# Patient Record
(demographics unavailable — no encounter records)

---

## 2025-07-08 NOTE — DISCUSSION/SUMMARY
[Medication Risks Reviewed] : Medication risks reviewed [PRN] : PRN [de-identified] : 45 minutes was spent reviewing the x-rays as well as discussing with the patient their clinical presentation, diagnosis and providing education.  Conservative treatment was discussed with the patient at length. Anticipatory guidance regarding disease process lumbar spondylosis, avoidance of acute exacerbation this was discussed at length and all patients commenting concerns were answered to the patient's satisfaction. Physical therapy for decrease pain and increase function was ordered. Patient was given home exercises as approved by [employer] and works well held directed toward this particular process. Intermittent use of acetaminophen 500 mg 2 tablets t.i.d. p.r.n. mild to moderate pain, ibuprofen 600 mg t.i.d. p.r.n. severe pain with food or milk if there is no medical contraindication. Home exercise including stretching on a daily basis for 20-30 minutes was recommended. Heat, ice, topical were discussed as needed. The patient will followup in 6-8 weeks at which point in time if symptoms continue we will order MRI studies to guide treatment plan including possible injection therapy with pain management versus surgical option.

## 2025-07-08 NOTE — HISTORY OF PRESENT ILLNESS
[de-identified] : 71-year-old male is here with a complaint of low back pain.  Pain has been intermittent x 13 years, rates 3/10 intermittently.  Described as muscle ache.  Pain began spontaneously.  He leads a very active lifestyle and does daily activities including going to the gym, biking.  He has no decrease in sensation or strength of the legs.  No change in bowel or bladder.  He has not had any physical therapy chiropractic or pain management injections for this issue.  He had an acute flareup of low back pain approximately 3 weeks ago and went to the emergency department and was referred here for follow-up care.  Past medical surgical social family allergy history was reviewed.

## 2025-07-08 NOTE — PHYSICAL EXAM
[de-identified] : CONSTITUTIONAL: The patient is a very pleasant individual who is well-nourished and who appears stated age. PSYCHIATRIC: The patient is alert and oriented X 3 and in no apparent distress, and participates with orthopedic evaluation well. HEAD: Atraumatic and is nonsyndromic in appearance. EENT: No visible thyromegaly, EOMI. RESPIRATORY: Respiratory rate is regular, not dyspneic on examination. LYMPHATICS: There is no inguinal lymphadenopathy INTEGUMENTARY: Skin is clean, dry, and intact about the bilateral lower extremities and lumbar spine. VASCULAR: There is brisk capillary refill about the bilateral lower extremities. NEUROLOGIC: There are no pathologic reflexes. There is no decrease in sensation of the bilateral lower extremities on manual examination. Deep tendon reflexes are well maintained at 2+/4 of the bilateral lower extremities and are symmetric.. MUSCULOSKELETAL: There is no visible muscular atrophy. Manual motor strength is well maintained in the bilateral lower extremities. Range of motion of lumbar spine is well maintained. The patient ambulates in a non-myelopathic manner. Negative tension sign and straight leg raise bilaterally. Quad extension, ankle dorsiflexion, EHL, plantar flexion, and ankle eversion are well preserved. Normal secondary orthopaedic exam of bilateral hips, greater trochanteric area, knees and ankles Mechanical low back pain on forward flexion as well as mild reproducible tenderness of the lower lumbar paraspinals. [de-identified] : AP lateral x-ray of the lumbar spine done [facility] radiology June 28, 2025 demonstrates a mild chronic superior endplate compression deformity at L2-L3.  Moderate L5-S1 spondylosis.  Normal sacrum and coccyx x-ray from [facility] radiology on the same date of June 28, 2025.